# Patient Record
Sex: MALE | Race: WHITE | NOT HISPANIC OR LATINO | Employment: OTHER | URBAN - METROPOLITAN AREA
[De-identification: names, ages, dates, MRNs, and addresses within clinical notes are randomized per-mention and may not be internally consistent; named-entity substitution may affect disease eponyms.]

---

## 2024-11-06 ENCOUNTER — OFFICE VISIT (OUTPATIENT)
Dept: INTERNAL MEDICINE | Facility: CLINIC | Age: 59
End: 2024-11-06
Payer: COMMERCIAL

## 2024-11-06 VITALS
OXYGEN SATURATION: 98 % | HEIGHT: 68 IN | SYSTOLIC BLOOD PRESSURE: 128 MMHG | BODY MASS INDEX: 27.28 KG/M2 | DIASTOLIC BLOOD PRESSURE: 76 MMHG | TEMPERATURE: 98 F | HEART RATE: 56 BPM | WEIGHT: 180 LBS

## 2024-11-06 DIAGNOSIS — Z12.5 SCREENING PSA (PROSTATE SPECIFIC ANTIGEN): ICD-10-CM

## 2024-11-06 DIAGNOSIS — N52.9 ERECTILE DYSFUNCTION, UNSPECIFIED ERECTILE DYSFUNCTION TYPE: ICD-10-CM

## 2024-11-06 DIAGNOSIS — Z23 NEED FOR VACCINATION: ICD-10-CM

## 2024-11-06 DIAGNOSIS — Z13.228 SCREENING FOR ENDOCRINE/METABOLIC/IMMUNITY DISORDERS: ICD-10-CM

## 2024-11-06 DIAGNOSIS — N40.1 BENIGN PROSTATIC HYPERPLASIA WITH NOCTURIA: ICD-10-CM

## 2024-11-06 DIAGNOSIS — R35.1 BENIGN PROSTATIC HYPERPLASIA WITH NOCTURIA: ICD-10-CM

## 2024-11-06 DIAGNOSIS — Z11.59 NEED FOR HEPATITIS C SCREENING TEST: ICD-10-CM

## 2024-11-06 DIAGNOSIS — Z13.29 SCREENING FOR ENDOCRINE/METABOLIC/IMMUNITY DISORDERS: ICD-10-CM

## 2024-11-06 DIAGNOSIS — Z13.0 SCREENING FOR ENDOCRINE/METABOLIC/IMMUNITY DISORDERS: ICD-10-CM

## 2024-11-06 DIAGNOSIS — E03.9 ACQUIRED HYPOTHYROIDISM: Primary | ICD-10-CM

## 2024-11-06 PROCEDURE — 91320 SARSCV2 VAC 30MCG TRS-SUC IM: CPT | Performed by: FAMILY MEDICINE

## 2024-11-06 PROCEDURE — 90656 IIV3 VACC NO PRSV 0.5 ML IM: CPT | Performed by: FAMILY MEDICINE

## 2024-11-06 PROCEDURE — 99204 OFFICE O/P NEW MOD 45 MIN: CPT | Performed by: FAMILY MEDICINE

## 2024-11-06 PROCEDURE — 90480 ADMN SARSCOV2 VAC 1/ONLY CMP: CPT | Performed by: FAMILY MEDICINE

## 2024-11-06 PROCEDURE — 90471 IMMUNIZATION ADMIN: CPT | Performed by: FAMILY MEDICINE

## 2024-11-06 RX ORDER — TADALAFIL 10 MG/1
10 TABLET ORAL DAILY PRN
COMMUNITY

## 2024-11-06 RX ORDER — CETIRIZINE HYDROCHLORIDE 10 MG/1
10 TABLET ORAL DAILY PRN
COMMUNITY

## 2024-11-06 RX ORDER — LEVOTHYROXINE SODIUM 50 MCG
1 TABLET ORAL DAILY
COMMUNITY
Start: 2023-01-16

## 2024-11-06 NOTE — PROGRESS NOTES
"Froylan Javier is a 59 y.o. male.     Chief Complaint   Patient presents with    Establish Care       Visit Vitals  /76 (BP Location: Right arm, Patient Position: Sitting, Cuff Size: Adult)   Pulse 56   Temp 98 °F (36.7 °C)   Ht 171.7 cm (67.6\")   Wt 81.6 kg (180 lb)   SpO2 98%   BMI 27.69 kg/m²         Hypothyroidism  Presents for initial visit. The condition has lasted for 1 year. Symptoms include weight loss (since on thyroid, but is eating better). Patient reports no anxiety, cold intolerance, constipation, depressed mood, diaphoresis, diarrhea, dry skin, fatigue, hair loss, heat intolerance, hoarse voice, leg swelling, nail problem, palpitations, tremors, visual change, weight gain, trouble swallowing, globus sensation or neck mass. The symptoms have been stable. Past treatments include levothyroxine. The treatment provided significant relief. The following procedures have not been performed: radioiodine uptake scan, thyroid FNA, thyroid ultrasound, thyroidectomy and radioiodine ablation. There is no history of atrial fibrillation, dementia, diabetes, Graves' ophthalmopathy, heart failure, hyperlipidemia, neuropathy, obesity or osteopenia. Risk factors for thyroid disorder include family history of hyperthyroidism. There is no known history of prior iodine 131 therapy, history of radiation exposure, history of head/neck radiation, family history of thyroid cancer, family history of hypothyroidism, family history of goiter, history of amiodarone use or history of iodine intake.   Benign Prostatic Hypertrophy  This is a chronic problem. The current episode started more than 1 year ago. The problem is unchanged. Irritative symptoms include nocturia and urgency. Irritative symptoms do not include frequency. Obstructive symptoms do not include dribbling, incomplete emptying, an intermittent stream, a slower stream, straining or a weak stream. Pertinent negatives include no chills, dysuria, genital " pain, hematuria, hesitancy, nausea or vomiting. He is sexually active. The symptoms are aggravated by prolonged sitting. Past treatments include nothing. The treatment provided no relief.   Erectile Dysfunction  This is a chronic problem. The current episode started more than 1 year ago. The problem has been gradually worsening since onset. The nature of his difficulty is achieving erection, maintaining erection and penetration. He reports no anxiety. He reports his erection duration to be 1 to 5 minutes. Irritative symptoms include nocturia and urgency. Irritative symptoms do not include frequency. Obstructive symptoms do not include dribbling, incomplete emptying, an intermittent stream, a slower stream, straining or a weak stream. Pertinent negatives include no chills, dysuria, genital pain, hematuria, hesitancy or inability to urinate. The symptoms are aggravated by alcohol use. Past treatments include tadalafil and sildenafil. The treatment provided mild relief. He has been using treatment for 1 to 2 years. He has had no adverse reactions caused by medications. Risk factors include no AM erections and BPH.      Pt here to establish care, Patient reports colonoscopy last year that was normal. Prior colonoscopy had polyps removed. Pt is in a 5-year repeat cycle.  Patient is taking medication for hypothyroidism but he has enough Synthroid currently.   Patient takes Zyrtec for seasonal allergies.      Pt state that he  has not noticed symptoms of low thyroid.   Patient had side effects with Viagra.  He feels better with Cialis.  Neither 1 is very effective.    Patient was previously treated at [facility].  The following portions of the patient's history were reviewed and updated as appropriate: allergies, current medications, past family history, past medical history, past social history, past surgical history, and problem list.    Past Medical History:   Diagnosis Date    Allergic     Benign prostatic  hyperplasia     this is a concern    Hx of colonoscopy with polypectomy       Past Surgical History:   Procedure Laterality Date    KNEE SURGERY      VASECTOMY  2006    WISDOM TOOTH EXTRACTION        Family History   Problem Relation Age of Onset    Cancer Mother         Pancreatic @ 88    Dementia Father       Social History     Socioeconomic History    Marital status:    Tobacco Use    Smoking status: Never    Smokeless tobacco: Never   Vaping Use    Vaping status: Never Used   Substance and Sexual Activity    Alcohol use: Yes     Alcohol/week: 8.0 standard drinks of alcohol     Types: 8 Cans of beer per week    Drug use: Never    Sexual activity: Yes     Partners: Female     Comment:       No Known Allergies    Review of Systems   Constitutional:  Positive for weight loss (since on thyroid, but is eating better). Negative for chills, diaphoresis, fatigue and weight gain.   HENT:  Negative for hoarse voice and trouble swallowing.    Cardiovascular:  Negative for palpitations.   Gastrointestinal:  Negative for constipation, diarrhea, nausea and vomiting.   Endocrine: Negative for cold intolerance and heat intolerance.   Genitourinary:  Positive for nocturia and urgency. Negative for dysuria, frequency, hematuria, hesitancy and incomplete emptying.   Neurological:  Negative for tremors.   Psychiatric/Behavioral:  The patient is not nervous/anxious.        Objective   Physical Exam  Vitals and nursing note reviewed.   Constitutional:       Appearance: He is well-developed.   HENT:      Head: Normocephalic.      Right Ear: External ear normal.      Left Ear: External ear normal.      Nose: Nose normal.   Eyes:      General: Lids are normal.      Conjunctiva/sclera: Conjunctivae normal.      Pupils: Pupils are equal, round, and reactive to light.   Neck:      Thyroid: No thyroid mass or thyromegaly.      Vascular: No carotid bruit.      Trachea: Trachea normal.   Cardiovascular:      Rate and Rhythm:  Normal rate and regular rhythm.      Heart sounds: No murmur heard.  Pulmonary:      Effort: Pulmonary effort is normal. No respiratory distress.      Breath sounds: Normal breath sounds. No decreased breath sounds, wheezing, rhonchi or rales.   Chest:      Chest wall: No tenderness.   Abdominal:      General: Bowel sounds are normal.      Palpations: Abdomen is soft.      Tenderness: There is no abdominal tenderness.   Musculoskeletal:         General: Normal range of motion.      Cervical back: Normal range of motion and neck supple.   Skin:     General: Skin is warm and dry.   Neurological:      Mental Status: He is alert and oriented to person, place, and time.   Psychiatric:         Behavior: Behavior normal.         Assessment & Plan   Problems Addressed this Visit          Endocrine and Metabolic    Acquired hypothyroidism - Primary    Relevant Medications    Synthroid 50 MCG tablet    Other Relevant Orders    TSH    T4, Free       Genitourinary and Reproductive     Benign prostatic hyperplasia with nocturia     Other Visit Diagnoses       Need for vaccination        Relevant Orders    COVID-19 (Pfizer) 12yrs+ (COMIRNATY) (Completed)    Fluzone >6mos (1535-7332) (Completed)    Erectile dysfunction, unspecified erectile dysfunction type        Screening for endocrine/metabolic/immunity disorders        Relevant Orders    Comprehensive Metabolic Panel    TSH    Lipid Panel    CBC & Differential    PSA Screen    Screening PSA (prostate specific antigen)        Relevant Orders    PSA Screen    Need for hepatitis C screening test        Relevant Orders    HCV Antibody Rfx To Qnt PCR          Diagnoses         Codes Comments    Acquired hypothyroidism    -  Primary ICD-10-CM: E03.9  ICD-9-CM: 244.9     Need for vaccination     ICD-10-CM: Z23  ICD-9-CM: V05.9     Benign prostatic hyperplasia with nocturia     ICD-10-CM: N40.1, R35.1  ICD-9-CM: 600.01, 788.43     Erectile dysfunction, unspecified erectile dysfunction  type     ICD-10-CM: N52.9  ICD-9-CM: 607.84     Screening for endocrine/metabolic/immunity disorders     ICD-10-CM: Z13.29, Z13.228, Z13.0  ICD-9-CM: V77.99     Screening PSA (prostate specific antigen)     ICD-10-CM: Z12.5  ICD-9-CM: V76.44     Need for hepatitis C screening test     ICD-10-CM: Z11.59  ICD-9-CM: V73.89                        Current Outpatient Medications:     cetirizine (zyrTEC) 10 MG tablet, Take 1 tablet by mouth Daily As Needed for Allergies (Seasonal allergies)., Disp: , Rfl:     Synthroid 50 MCG tablet, Take 1 tablet by mouth Daily., Disp: , Rfl:     tadalafil (Cialis) 10 MG tablet, Take 1 tablet by mouth Daily As Needed for Erectile Dysfunction., Disp: , Rfl:     Return in about 3 months (around 2/6/2025), or if symptoms worsen or fail to improve, for Recheck, Annual physical.

## 2024-11-08 ENCOUNTER — PATIENT ROUNDING (BHMG ONLY) (OUTPATIENT)
Dept: INTERNAL MEDICINE | Facility: CLINIC | Age: 59
End: 2024-11-08
Payer: COMMERCIAL

## 2024-11-08 NOTE — PROGRESS NOTES
My name is Michell Kasper and I am a patient experience representative for [facility] on [facility].    I would like to officially welcome you to our practice.  If you do not mind I would like to ask you a few questions about your recent visit with us.  If you do not have the time to reply that is perfectly fine.      First, could you tell me what went well with your recent visit?     Secondly, we are always looking for ways to make our patients' experiences even better. Do you have any recommendations on ways we may improve?     Third, safety is a top priority therefore do you have any concerns with that while in our office?    Finally, overall were you satisfied with your first visit to our practice?     Thank you for taking the time to answer a few questions today.  In the coming days you will receive a survey.  We encourage you to complete the survey to let us know how we did!        Michell

## 2024-11-13 ENCOUNTER — PATIENT MESSAGE (OUTPATIENT)
Dept: INTERNAL MEDICINE | Facility: CLINIC | Age: 59
End: 2024-11-13
Payer: COMMERCIAL

## 2024-11-13 DIAGNOSIS — N52.9 ERECTILE DYSFUNCTION, UNSPECIFIED ERECTILE DYSFUNCTION TYPE: Primary | ICD-10-CM

## 2024-11-14 ENCOUNTER — LAB (OUTPATIENT)
Dept: INTERNAL MEDICINE | Facility: CLINIC | Age: 59
End: 2024-11-14
Payer: COMMERCIAL

## 2024-11-14 DIAGNOSIS — Z13.0 SCREENING FOR ENDOCRINE/METABOLIC/IMMUNITY DISORDERS: ICD-10-CM

## 2024-11-14 DIAGNOSIS — Z13.29 SCREENING FOR ENDOCRINE/METABOLIC/IMMUNITY DISORDERS: ICD-10-CM

## 2024-11-14 DIAGNOSIS — Z11.59 NEED FOR HEPATITIS C SCREENING TEST: ICD-10-CM

## 2024-11-14 DIAGNOSIS — E03.9 ACQUIRED HYPOTHYROIDISM: ICD-10-CM

## 2024-11-14 DIAGNOSIS — Z13.228 SCREENING FOR ENDOCRINE/METABOLIC/IMMUNITY DISORDERS: ICD-10-CM

## 2024-11-14 DIAGNOSIS — Z12.5 SCREENING PSA (PROSTATE SPECIFIC ANTIGEN): ICD-10-CM

## 2024-11-14 DIAGNOSIS — N52.9 ERECTILE DYSFUNCTION, UNSPECIFIED ERECTILE DYSFUNCTION TYPE: ICD-10-CM

## 2024-11-14 LAB
BASOPHILS # BLD AUTO: 0.09 10*3/MM3 (ref 0–0.2)
BASOPHILS NFR BLD AUTO: 1.1 % (ref 0–1.5)
DEPRECATED RDW RBC AUTO: 37.1 FL (ref 37–54)
EOSINOPHIL # BLD AUTO: 0.3 10*3/MM3 (ref 0–0.4)
EOSINOPHIL NFR BLD AUTO: 3.7 % (ref 0.3–6.2)
ERYTHROCYTE [DISTWIDTH] IN BLOOD BY AUTOMATED COUNT: 12 % (ref 12.3–15.4)
HCT VFR BLD AUTO: 47.8 % (ref 37.5–51)
HGB BLD-MCNC: 16.2 G/DL (ref 13–17.7)
IMM GRANULOCYTES # BLD AUTO: 0.04 10*3/MM3 (ref 0–0.05)
IMM GRANULOCYTES NFR BLD AUTO: 0.5 % (ref 0–0.5)
LYMPHOCYTES # BLD AUTO: 3.55 10*3/MM3 (ref 0.7–3.1)
LYMPHOCYTES NFR BLD AUTO: 44.3 % (ref 19.6–45.3)
MCH RBC QN AUTO: 29 PG (ref 26.6–33)
MCHC RBC AUTO-ENTMCNC: 33.9 G/DL (ref 31.5–35.7)
MCV RBC AUTO: 85.5 FL (ref 79–97)
MONOCYTES # BLD AUTO: 0.55 10*3/MM3 (ref 0.1–0.9)
MONOCYTES NFR BLD AUTO: 6.9 % (ref 5–12)
NEUTROPHILS NFR BLD AUTO: 3.48 10*3/MM3 (ref 1.7–7)
NEUTROPHILS NFR BLD AUTO: 43.5 % (ref 42.7–76)
NRBC BLD AUTO-RTO: 0 /100 WBC (ref 0–0.2)
PLATELET # BLD AUTO: 283 10*3/MM3 (ref 140–450)
PMV BLD AUTO: 10 FL (ref 6–12)
RBC # BLD AUTO: 5.59 10*6/MM3 (ref 4.14–5.8)
WBC NRBC COR # BLD AUTO: 8.01 10*3/MM3 (ref 3.4–10.8)

## 2024-11-14 PROCEDURE — 36415 COLL VENOUS BLD VENIPUNCTURE: CPT | Performed by: FAMILY MEDICINE

## 2024-11-14 PROCEDURE — 85025 COMPLETE CBC W/AUTO DIFF WBC: CPT | Performed by: FAMILY MEDICINE

## 2024-11-14 PROCEDURE — 86803 HEPATITIS C AB TEST: CPT | Performed by: FAMILY MEDICINE

## 2024-11-14 PROCEDURE — 84443 ASSAY THYROID STIM HORMONE: CPT | Performed by: FAMILY MEDICINE

## 2024-11-14 PROCEDURE — 84403 ASSAY OF TOTAL TESTOSTERONE: CPT | Performed by: FAMILY MEDICINE

## 2024-11-14 PROCEDURE — 84439 ASSAY OF FREE THYROXINE: CPT | Performed by: FAMILY MEDICINE

## 2024-11-14 PROCEDURE — G0103 PSA SCREENING: HCPCS | Performed by: FAMILY MEDICINE

## 2024-11-14 PROCEDURE — 80061 LIPID PANEL: CPT | Performed by: FAMILY MEDICINE

## 2024-11-14 PROCEDURE — 80053 COMPREHEN METABOLIC PANEL: CPT | Performed by: FAMILY MEDICINE

## 2024-11-15 LAB
ALBUMIN SERPL-MCNC: 4.3 G/DL (ref 3.5–5.2)
ALBUMIN/GLOB SERPL: 1.5 G/DL
ALP SERPL-CCNC: 81 U/L (ref 39–117)
ALT SERPL W P-5'-P-CCNC: 20 U/L (ref 1–41)
ANION GAP SERPL CALCULATED.3IONS-SCNC: 8.7 MMOL/L (ref 5–15)
AST SERPL-CCNC: 20 U/L (ref 1–40)
BILIRUB SERPL-MCNC: 0.7 MG/DL (ref 0–1.2)
BUN SERPL-MCNC: 15 MG/DL (ref 6–20)
BUN/CREAT SERPL: 15.2 (ref 7–25)
CALCIUM SPEC-SCNC: 9.7 MG/DL (ref 8.6–10.5)
CHLORIDE SERPL-SCNC: 105 MMOL/L (ref 98–107)
CHOLEST SERPL-MCNC: 182 MG/DL (ref 0–200)
CO2 SERPL-SCNC: 28.3 MMOL/L (ref 22–29)
CREAT SERPL-MCNC: 0.99 MG/DL (ref 0.76–1.27)
EGFRCR SERPLBLD CKD-EPI 2021: 88.9 ML/MIN/1.73
GLOBULIN UR ELPH-MCNC: 2.8 GM/DL
GLUCOSE SERPL-MCNC: 78 MG/DL (ref 65–99)
HDLC SERPL-MCNC: 43 MG/DL (ref 40–60)
LDLC SERPL CALC-MCNC: 124 MG/DL (ref 0–100)
LDLC/HDLC SERPL: 2.87 {RATIO}
POTASSIUM SERPL-SCNC: 4.7 MMOL/L (ref 3.5–5.2)
PROT SERPL-MCNC: 7.1 G/DL (ref 6–8.5)
PSA SERPL-MCNC: 0.52 NG/ML (ref 0–4)
SODIUM SERPL-SCNC: 142 MMOL/L (ref 136–145)
T4 FREE SERPL-MCNC: 1.46 NG/DL (ref 0.92–1.68)
TESTOST SERPL-MCNC: 646 NG/DL (ref 193–740)
TRIGL SERPL-MCNC: 78 MG/DL (ref 0–150)
TSH SERPL DL<=0.05 MIU/L-ACNC: 3.04 UIU/ML (ref 0.27–4.2)
VLDLC SERPL-MCNC: 15 MG/DL (ref 5–40)

## 2024-11-16 LAB
HCV AB SERPL QL IA: NORMAL
HCV IGG SERPL QL IA: NON REACTIVE

## 2025-01-08 RX ORDER — LEVOTHYROXINE SODIUM 50 MCG
50 TABLET ORAL DAILY
Qty: 90 TABLET | Refills: 1 | Status: SHIPPED | OUTPATIENT
Start: 2025-01-08

## 2025-01-13 ENCOUNTER — TELEPHONE (OUTPATIENT)
Dept: INTERNAL MEDICINE | Facility: CLINIC | Age: 60
End: 2025-01-13
Payer: COMMERCIAL

## 2025-01-13 NOTE — TELEPHONE ENCOUNTER
Synthroid 50 mcg ALYSIA written.  [facility] uses [facility] anyway and by taking off the ALYSIA the patient can get medication for 10.00.  I have let the pharmacy know this will be okay to drop the ALYSIA since they are dispensing the synthroid brand.  [facility] dispenses the brand for their generic.  Dr. Fernández is okay with the change.

## 2025-02-12 ENCOUNTER — OFFICE VISIT (OUTPATIENT)
Dept: INTERNAL MEDICINE | Facility: CLINIC | Age: 60
End: 2025-02-12
Payer: COMMERCIAL

## 2025-02-12 VITALS
TEMPERATURE: 97.7 F | WEIGHT: 190 LBS | HEART RATE: 66 BPM | DIASTOLIC BLOOD PRESSURE: 76 MMHG | SYSTOLIC BLOOD PRESSURE: 134 MMHG | OXYGEN SATURATION: 98 % | BODY MASS INDEX: 28.79 KG/M2 | HEIGHT: 68 IN

## 2025-02-12 DIAGNOSIS — R35.0 BENIGN PROSTATIC HYPERPLASIA WITH URINARY FREQUENCY: ICD-10-CM

## 2025-02-12 DIAGNOSIS — Z00.00 ANNUAL PHYSICAL EXAM: Primary | ICD-10-CM

## 2025-02-12 DIAGNOSIS — L98.9 SKIN LESION OF HAND: ICD-10-CM

## 2025-02-12 DIAGNOSIS — E78.00 ELEVATED CHOLESTEROL: ICD-10-CM

## 2025-02-12 DIAGNOSIS — N40.1 BENIGN PROSTATIC HYPERPLASIA WITH URINARY FREQUENCY: ICD-10-CM

## 2025-02-12 LAB
BILIRUB BLD-MCNC: NEGATIVE MG/DL
CLARITY, POC: CLEAR
COLOR UR: YELLOW
EXPIRATION DATE: NORMAL
GLUCOSE UR STRIP-MCNC: NEGATIVE MG/DL
KETONES UR QL: NEGATIVE
LEUKOCYTE EST, POC: NEGATIVE
Lab: NORMAL
NITRITE UR-MCNC: NEGATIVE MG/ML
PH UR: 7.5 [PH] (ref 5–8)
PROT UR STRIP-MCNC: NEGATIVE MG/DL
RBC # UR STRIP: NEGATIVE /UL
SP GR UR: 1.01 (ref 1–1.03)
UROBILINOGEN UR QL: NORMAL

## 2025-02-12 PROCEDURE — 81003 URINALYSIS AUTO W/O SCOPE: CPT | Performed by: FAMILY MEDICINE

## 2025-02-12 PROCEDURE — 99396 PREV VISIT EST AGE 40-64: CPT | Performed by: FAMILY MEDICINE

## 2025-02-12 RX ORDER — TAMSULOSIN HYDROCHLORIDE 0.4 MG/1
1 CAPSULE ORAL DAILY
Qty: 90 CAPSULE | Refills: 1 | Status: SHIPPED | OUTPATIENT
Start: 2025-02-12

## 2025-02-12 NOTE — PROGRESS NOTES
"Subjective   Theo Javier is a 59 y.o. male.         Chief Complaint   Patient presents with    Annual Exam       Visit Vitals  /76 (BP Location: Right arm, Patient Position: Sitting, Cuff Size: Adult)   Pulse 66   Temp 97.7 °F (36.5 °C)   Ht 171.7 cm (67.6\")   Wt 86.2 kg (190 lb)   SpO2 98%   BMI 29.23 kg/m²         Benign Prostatic Hypertrophy  This is a chronic problem. The current episode started more than 1 year ago. The problem has been gradually worsening since onset. Irritative symptoms include frequency, nocturia and urgency. Obstructive symptoms include incomplete emptying, a slower stream and a weak stream. Obstructive symptoms do not include dribbling, an intermittent stream or straining. Pertinent negatives include no chills, dysuria, hematuria, hesitancy, nausea or vomiting. The symptoms are aggravated by caffeine. Treatments tried: Cialis. The treatment provided mild relief.      Pt here for annual exam. Pt thinks that he had colonoscopy last yr at [facility]    Pt having BPH problems.      Pt has lesion on left hand and does a lot of outside activity.   The following portions of the patient's history were reviewed and updated as appropriate: allergies, current medications, past family history, past medical history, past social history, past surgical history, and problem list.    Past Medical History:   Diagnosis Date    Allergic     Benign prostatic hyperplasia     this is a concern    Hx of colonoscopy with polypectomy       Past Surgical History:   Procedure Laterality Date    COLONOSCOPY  07/23/2024    Dr Murray    KNEE SURGERY      VASECTOMY  2006    WISDOM TOOTH EXTRACTION        Family History   Problem Relation Age of Onset    Cancer Mother         Pancreatic @ 88    Dementia Father       Social History     Socioeconomic History    Marital status:    Tobacco Use    Smoking status: Never    Smokeless tobacco: Never   Vaping Use    Vaping status: Never Used   Substance and " Sexual Activity    Alcohol use: Yes     Alcohol/week: 8.0 standard drinks of alcohol     Types: 8 Cans of beer per week    Drug use: Never    Sexual activity: Yes     Partners: Female     Comment:       No Known Allergies    Review of Systems   Constitutional:  Negative for activity change, appetite change, chills, diaphoresis, fatigue, fever and unexpected weight change.   HENT:  Positive for tinnitus (intermittent). Negative for congestion, dental problem, drooling, ear discharge, ear pain, facial swelling, hearing loss, mouth sores, nosebleeds, postnasal drip, rhinorrhea, sinus pressure, sinus pain, sneezing, sore throat, trouble swallowing and voice change.    Eyes:  Negative for photophobia, pain, discharge, redness, itching and visual disturbance.   Respiratory:  Negative for apnea, cough, choking, chest tightness, shortness of breath, wheezing and stridor.    Cardiovascular:  Negative for chest pain, palpitations and leg swelling.   Gastrointestinal:  Negative for abdominal distention, abdominal pain, anal bleeding, blood in stool, constipation, diarrhea, nausea, rectal pain and vomiting.   Endocrine: Negative for cold intolerance, heat intolerance, polydipsia, polyphagia and polyuria.   Genitourinary:  Positive for decreased urine volume, difficulty urinating, frequency, incomplete emptying, nocturia and urgency. Negative for dysuria, enuresis, flank pain, genital sores, hematuria, hesitancy, penile discharge, penile pain, penile swelling, scrotal swelling and testicular pain.        BPH   Musculoskeletal:  Positive for arthralgias. Negative for back pain (knee pain), gait problem, joint swelling, myalgias, neck pain and neck stiffness.   Skin:  Positive for rash. Negative for color change, pallor and wound.   Allergic/Immunologic: Negative for environmental allergies, food allergies and immunocompromised state.   Neurological:  Negative for dizziness, tremors, seizures, syncope, facial asymmetry,  speech difficulty, weakness, light-headedness, numbness and headaches.   Hematological:  Negative for adenopathy. Does not bruise/bleed easily.   Psychiatric/Behavioral:  Negative for agitation, behavioral problems, confusion, decreased concentration, dysphoric mood, hallucinations, self-injury, sleep disturbance and suicidal ideas. The patient is not nervous/anxious and is not hyperactive.      PHQ-2 Depression Screening  Little interest or pleasure in doing things? Not at all   Feeling down, depressed, or hopeless? Not at all   PHQ-2 Total Score 0     Anxiety OMA-7    Feeling nervous, anxious or on edge: Not at all  Not being able to stop or control worrying: Not at all  Worrying too much about different things: Not at all  Trouble Relaxing: Not at all  Being so restless that it is hard to sit still: Not at all  Becoming easily annoyed or irritable: Not at all  Feeling afraid as if something awful might happen: Not at all  OMA 7 Total Score: 0  If you checked any problems, how difficult have these problems made it for you to do your work, take care of things at home, or get along with other people: Not difficult at all         Objective   Physical Exam  Vitals and nursing note reviewed.   Constitutional:       General: He is not in acute distress.     Appearance: He is well-developed. He is not diaphoretic.   HENT:      Head: Normocephalic and atraumatic.      Right Ear: Tympanic membrane, ear canal and external ear normal.      Left Ear: Tympanic membrane, ear canal and external ear normal.      Nose: Nose normal.      Mouth/Throat:      Lips: Pink.      Mouth: Mucous membranes are moist. Mucous membranes are not pale, not dry and not cyanotic. No injury.      Dentition: Normal dentition.      Tongue: No lesions.      Palate: No mass.      Pharynx: Uvula midline. No pharyngeal swelling, oropharyngeal exudate, posterior oropharyngeal erythema or uvula swelling.   Eyes:      General: Lids are normal. No scleral  icterus.        Right eye: No foreign body, discharge or hordeolum.         Left eye: No foreign body, discharge or hordeolum.      Extraocular Movements:      Right eye: Normal extraocular motion and no nystagmus.      Left eye: Normal extraocular motion and no nystagmus.      Conjunctiva/sclera: Conjunctivae normal.      Right eye: Right conjunctiva is not injected. No chemosis, exudate or hemorrhage.     Left eye: Left conjunctiva is not injected. No chemosis, exudate or hemorrhage.     Pupils: Pupils are equal, round, and reactive to light.   Neck:      Thyroid: No thyroid mass or thyromegaly.      Vascular: No carotid bruit.      Trachea: Trachea normal. No tracheal deviation.   Cardiovascular:      Rate and Rhythm: Normal rate and regular rhythm.      Pulses:           Dorsalis pedis pulses are 2+ on the right side and 2+ on the left side.        Posterior tibial pulses are 2+ on the right side and 2+ on the left side.      Heart sounds: Normal heart sounds. No murmur heard.     No friction rub. No gallop.   Pulmonary:      Effort: Pulmonary effort is normal. No respiratory distress.      Breath sounds: Normal breath sounds. No decreased breath sounds, wheezing, rhonchi or rales.   Chest:      Chest wall: No deformity or tenderness. There is no dullness to percussion.   Breasts:     Breasts are symmetrical.      Right: Normal. No swelling, bleeding, inverted nipple, mass, nipple discharge, skin change or tenderness.      Left: Normal. No swelling, bleeding, inverted nipple, mass, nipple discharge, skin change or tenderness.   Abdominal:      General: Bowel sounds are normal. There is no distension.      Palpations: Abdomen is soft. There is no hepatomegaly, splenomegaly, mass or pulsatile mass.      Tenderness: There is no abdominal tenderness. There is no guarding or rebound.      Hernia: No hernia is present.   Musculoskeletal:         General: No tenderness or deformity. Normal range of motion.       Cervical back: Normal range of motion and neck supple.   Lymphadenopathy:      Head:      Right side of head: No submandibular adenopathy.      Left side of head: No submandibular adenopathy.      Cervical: No cervical adenopathy.      Right cervical: No superficial, deep or posterior cervical adenopathy.     Left cervical: No superficial, deep or posterior cervical adenopathy.      Upper Body:      Right upper body: No supraclavicular, axillary or pectoral adenopathy.      Left upper body: No supraclavicular, axillary or pectoral adenopathy.   Skin:     General: Skin is warm and dry.      Findings: No rash.      Nails: There is no clubbing.             Comments: 2 cm  macular slightly erythematous lesion in the web of left thumb dorsal side.  Slightly irregular surface.   Neurological:      Mental Status: He is alert and oriented to person, place, and time.      Cranial Nerves: No cranial nerve deficit.      Sensory: No sensory deficit.      Coordination: Coordination normal.      Deep Tendon Reflexes: Reflexes are normal and symmetric.      Reflex Scores:       Bicep reflexes are 2+ on the right side and 2+ on the left side.       Brachioradialis reflexes are 2+ on the right side and 2+ on the left side.       Patellar reflexes are 2+ on the right side and 2+ on the left side.  Psychiatric:         Attention and Perception: Attention normal.         Mood and Affect: Mood and affect normal.         Speech: Speech normal.         Behavior: Behavior normal.         Thought Content: Thought content normal.         Cognition and Memory: Cognition and memory normal.         Judgment: Judgment normal.         Assessment & Plan   Problems Addressed this Visit    None  Visit Diagnoses       Annual physical exam    -  Primary    Relevant Orders    POCT urinalysis dipstick, automated (Completed)    Benign prostatic hyperplasia with urinary frequency        Relevant Medications    tamsulosin (FLOMAX) 0.4 MG capsule 24 hr  capsule    Other Relevant Orders    Ambulatory Referral to Urology    Skin lesion of hand        Relevant Orders    Ambulatory Referral to Dermatology (Completed)    Elevated cholesterol        Relevant Orders    Comprehensive Metabolic Panel    Lipid Panel          Diagnoses         Codes Comments    Annual physical exam    -  Primary ICD-10-CM: Z00.00  ICD-9-CM: V70.0     Benign prostatic hyperplasia with urinary frequency     ICD-10-CM: N40.1, R35.0  ICD-9-CM: 600.01, 788.41     Skin lesion of hand     ICD-10-CM: L98.9  ICD-9-CM: 709.9     Elevated cholesterol     ICD-10-CM: E78.00  ICD-9-CM: 272.0           Add Flomax, for BPH,   Derm referral. Urology referral             Current Outpatient Medications:     cetirizine (zyrTEC) 10 MG tablet, Take 1 tablet by mouth Daily As Needed for Allergies (Seasonal allergies)., Disp: , Rfl:     Synthroid 50 MCG tablet, Take 1 tablet by mouth Daily., Disp: 90 tablet, Rfl: 1    tadalafil (Cialis) 10 MG tablet, Take 1 tablet by mouth Daily As Needed for Erectile Dysfunction., Disp: , Rfl:     tamsulosin (FLOMAX) 0.4 MG capsule 24 hr capsule, Take 1 capsule by mouth Daily., Disp: 90 capsule, Rfl: 1    Return in about 6 months (around 8/12/2025), or if symptoms worsen or fail to improve, for Recheck BPH LIPID.    Please follow a low animal fat diet that is also low in sugar, low in junk food, low in sweet drinks and low in alcohol.  Please increase the amount of fiber in your diet as well as increasing your daily exercise, such as walking.  Recent Results (from the past week)   POCT urinalysis dipstick, automated    Collection Time: 02/12/25 10:46 AM    Specimen: Urine   Result Value Ref Range    Color Yellow Yellow, Straw, Dark Yellow, Lily    Clarity, UA Clear Clear    Specific Gravity  1.015 1.005 - 1.030    pH, Urine 7.5 5.0 - 8.0    Leukocytes Negative Negative    Nitrite, UA Negative Negative    Protein, POC Negative Negative mg/dL    Glucose, UA Negative Negative mg/dL     Ketones, UA Negative Negative    Urobilinogen, UA Normal Normal, 0.2 E.U./dL    Bilirubin Negative Negative    Blood, UA Negative Negative    Lot Number [id number]     Expiration Date 4/10/2026

## 2025-04-02 ENCOUNTER — OFFICE VISIT (OUTPATIENT)
Dept: UROLOGY | Facility: CLINIC | Age: 60
End: 2025-04-02
Payer: COMMERCIAL

## 2025-04-02 VITALS — OXYGEN SATURATION: 97 % | DIASTOLIC BLOOD PRESSURE: 88 MMHG | SYSTOLIC BLOOD PRESSURE: 150 MMHG | HEART RATE: 73 BPM

## 2025-04-02 DIAGNOSIS — N40.1 BENIGN PROSTATIC HYPERPLASIA WITH URINARY FREQUENCY: Primary | ICD-10-CM

## 2025-04-02 DIAGNOSIS — R35.0 BENIGN PROSTATIC HYPERPLASIA WITH URINARY FREQUENCY: Primary | ICD-10-CM

## 2025-04-02 NOTE — PROGRESS NOTES
LUTS Male Office Visit      Patient Name: Theo Javier  : 1965   MRN: [id number]     Chief Complaint:  Lower Urinary Tract Symptoms.   Chief Complaint   Patient presents with    Benign Prostatic Hypertrophy    Urinary Frequency        Referring Provider: Zenia Fernández MD    History of Present Illness: Mr. Javier is a 59 y.o. male with history of lower urinary tract symptoms.  He has been diagnosed with presumed BPH. His PSA is normal. He has been on Flomax for 6 wks and has noticed a mild improvement in symptoms. He uses 10 mg Cialis PRN for mild to moderate ED. his main symptoms include weak stream and urgency/frequency.  He wakes up once a night to urinate.  Quality of life is mixed.  No family history of prostate cancer.  PSA is normal.    Lab Results   Component Value Date    PSA 0.523 2024         Subjective      Review of System: Review of Systems   Genitourinary:  Positive for frequency and urgency.      I have reviewed the ROS documented by my clinical staff, I have updated appropriately and I agree. Daryl Cohen MD    Past Medical History:  Past Medical History:   Diagnosis Date    Allergic     Benign prostatic hyperplasia Five years+/-    this is a concern    Erectile dysfunction 10 years +    Hx of colonoscopy with polypectomy        Past Surgical History:  Past Surgical History:   Procedure Laterality Date    COLONOSCOPY  2024    Dr Murray    KNEE SURGERY      VASECTOMY  2006    WISDOM TOOTH EXTRACTION         Medications:    Current Outpatient Medications:     cetirizine (zyrTEC) 10 MG tablet, Take 1 tablet by mouth Daily As Needed for Allergies (Seasonal allergies)., Disp: , Rfl:     Synthroid 50 MCG tablet, Take 1 tablet by mouth Daily., Disp: 90 tablet, Rfl: 1    tadalafil (Cialis) 10 MG tablet, Take 1 tablet by mouth Daily As Needed for Erectile Dysfunction., Disp: , Rfl:     tamsulosin (FLOMAX) 0.4 MG capsule 24 hr capsule, Take 1 capsule by mouth Daily., Disp:  90 capsule, Rfl: 1    Allergies:  No Known Allergies    Social History:  Social History     Socioeconomic History    Marital status:    Tobacco Use    Smoking status: Never    Smokeless tobacco: Never   Vaping Use    Vaping status: Never Used   Substance and Sexual Activity    Alcohol use: Yes     Alcohol/week: 8.0 standard drinks of alcohol     Types: 8 Cans of beer per week    Drug use: Never    Sexual activity: Yes     Partners: Female     Birth control/protection: Vasectomy     Comment:        Family History:  Family History   Problem Relation Age of Onset    Cancer Mother         Pancreatic @ 88    Dementia Father        IPSS Questionnaire (AUA-7):  Over the past month…    1)  Incomplete Emptying:       How often have you had a sensation of not emptying you had the sensation of not emptying your bladder completely after you finished urinating?  3 - About half the time   2)  Frequency:       How often have you had the urinate again less than two hours after you finished urinating?  3 - About half the time   3)  Intermittency:       How often have you found you stopped and started again several times when you urinated?   0 - Not at all   4) Urgency:      How often have you found it difficult to postpone urination?  3 - About half the time   5) Weak Stream:      How often have you had a weak urinary stream?  2 - Less than half the time   6) Straining:       How often have you had to push or strain to begin urination?  0 - Not at all   7) Nocturia:      How many times did you most typically get up to urinate from the time you went to bed at night until the time you got up in the morning?  1 - 1 time   Total Score:  12   The International Prostate Symptom Score (IPSS) is used to screen, diagnose, track symptoms of benign prostatic hyperplasia (BPH).   0-7 (Mild Symptoms) 8-19 (Moderate) 20-35 (Severe)   Quality of Life (QoL):  If you were to spend the rest of your life with your urinary condition just  the way it is now, how would you feel about that? 3-Mixed   Urine Leakage (Incontinence) 0-No Leakage         Post void residual bladder scan:   0 mL    Objective     Physical Exam:   Vital Signs:   Vitals:    04/02/25 1356   BP: 150/88   Pulse: 73   SpO2: 97%     There is no height or weight on file to calculate BMI.     Physical Exam  Constitutional:       Appearance: Normal appearance.   HENT:      Head: Normocephalic and atraumatic.      Nose: Nose normal.   Eyes:      Extraocular Movements: Extraocular movements intact.      Conjunctiva/sclera: Conjunctivae normal.      Pupils: Pupils are equal, round, and reactive to light.   Musculoskeletal:         General: Normal range of motion.      Cervical back: Normal range of motion and neck supple.   Skin:     General: Skin is warm and dry.      Findings: No lesion or rash.   Neurological:      General: No focal deficit present.      Mental Status: He is alert and oriented to person, place, and time. Mental status is at baseline.   Psychiatric:         Mood and Affect: Mood normal.         Behavior: Behavior normal.         Labs:   Lab Results   Component Value Date    PSA 0.523 11/14/2024       Brief Urine Lab Results  (Last result in the past 365 days)        Color   Clarity   Blood   Leuk Est   Nitrite   Protein   CREAT   Urine HCG        02/12/25 1046 Yellow   Clear   Negative   Negative   Negative   Negative                        Lab Results   Component Value Date    GLUCOSE 78 11/14/2024    CALCIUM 9.7 11/14/2024     11/14/2024    K 4.7 11/14/2024    CO2 28.3 11/14/2024     11/14/2024    BUN 15 11/14/2024    CREATININE 0.99 11/14/2024    BCR 15.2 11/14/2024    ANIONGAP 8.7 11/14/2024       Lab Results   Component Value Date    WBC 8.01 11/14/2024    HGB 16.2 11/14/2024    HCT 47.8 11/14/2024    MCV 85.5 11/14/2024     11/14/2024       Images:   No Images in the past 120 days found..    Measures:   Tobacco:   Theo Javier  reports that he has  never smoked. He has never used smokeless tobacco.    Assessment / Plan      Assessment:  Mr. Javier is a 59 y.o. male who presented today with lower urinary tract symptoms.  Recently started Flomax 6 weeks ago.  Mild improvement in symptoms.  Still struggling with weak stream and urgency.  Discussed empiric medication therapy to treat his bothersome storage related symptoms and I would recommend oxybutynin, or proceed with a full workup including cystoscopy, TRUS, uroflow, or continue to allow the tamsulosin to potentially improve his symptoms over time.  He is interested in a further workup.  Risks of cystoscopy include bleeding, dysuria, infection.  Schedule next available.    Diagnoses and all orders for this visit:    1. Benign prostatic hyperplasia with urinary frequency (Primary)            Follow Up:   Return for Cysto TRUS Uroflow in 2-3 weeks .    I spent approximately 30 minutes providing clinical care for this patient; including review of patient's chart and provider documentation, face to face time spent with patient in examination room (obtaining history, performing physical exam, discussing diagnosis and management options), placing orders, and completing patient documentation.     Daryl Cohen MD  [facility] [place]

## 2025-05-01 ENCOUNTER — PROCEDURE VISIT (OUTPATIENT)
Dept: UROLOGY | Facility: CLINIC | Age: 60
End: 2025-05-01
Payer: COMMERCIAL

## 2025-05-01 DIAGNOSIS — N32.81 OAB (OVERACTIVE BLADDER): ICD-10-CM

## 2025-05-01 DIAGNOSIS — R35.0 BENIGN PROSTATIC HYPERPLASIA WITH URINARY FREQUENCY: Primary | ICD-10-CM

## 2025-05-01 DIAGNOSIS — N40.1 BENIGN PROSTATIC HYPERPLASIA WITH URINARY FREQUENCY: Primary | ICD-10-CM

## 2025-05-01 RX ORDER — OXYBUTYNIN CHLORIDE 10 MG/1
10 TABLET, EXTENDED RELEASE ORAL DAILY
Qty: 90 TABLET | Refills: 1 | Status: SHIPPED | OUTPATIENT
Start: 2025-05-01

## 2025-05-01 NOTE — PROGRESS NOTES
Preprocedure diagnosis  LUTS    Postprocedure diagnosis  BPH with LUTS    Procedure  Flexible Cystourethroscopy   Transrectal Ultrasound Guided Prostate Sizing  Uroflowmetry + PVR    Attending surgeon  Daryl Cohen MD    Anesthesia  2% lidocaine jelly intraurethrally    Complications  None    Indications  59 y.o. male undergoing a flexible cystoscopy for the above mentioned indications.  Informed consent was obtained.      Findings  No obvious urethral stricture, bladder tumor, bladder stones, or urothelial lesions identified.    Moderate lateral lobe hyperplasia, no intravesical median lobe.  Mild trabeculation - chronic findings indicative of likely chronic bladder outlet obstruction     TRUS prostate sizing personally performed, (height (cm) × length (cm) × width (cm) × ?/6) = 43 cc/mL.     Procedure  The patient was placed in supine position and prepped and draped in sterile fashion with lidocaine jelly instilled per the urethra for local anesthesia 5 minutes prior to procedure start.  A brief timeout was performed including available nursing staff and the patient.      The 16 Fr digital flexible cystoscope was lubricated and gently advanced into the urethral meatus.     Penile and bulbar urethra appeared widely patent without visible lesion or stricture.   Prostatic urethra demonstrates moderate lateral lobe coaptation, with no median lobe component.     Scope then advanced into the bladder. Bladder was completely visualized starting with the trigone.   There were bilateral orthotopic ureteral orifices effluxing clear urine.   Posterior, lateral, anterior walls, and dome completely visualized revealing NO obvious mucosal lesions, tumors, or bladder stones.    There is mild trabeculation along the posterior and lateral walls with no bladder divericuli.     Cystoscope was retroflexed and bladder neck and prostate further visualized confirming intravesical lateral lobes protruding into bladder floor  somewhat.    The bladder was left filled, and the cystoscope was then gently withdrawn while visualizing the urethra and the procedure was then terminated.      The patient was then positioned and prepped in a left lateral position with legs flexed at knee.    A digital rectal exam was performed revealing smooth gland without nodules or induration.    Rectal ultrasound probe was slowly introduced via anus without difficulty.    Prostate and seminal vesicles were inspected systematically using axial and sagittal views with the ultrasound.      The dimensions of the prostate were measured, for a calculated volume of 43 mL. PSA Density non concerning.    Lab Results   Component Value Date    PSA 0.523 11/14/2024          Rectal ultrasound probe was removed.      The patient tolerated the procedure well.      He then voided for uroflowmetry measurement and PVR bladder scan was performed afterwards.     Discussion:  The patient was moved from the procedure room to a clinic examination room to review results of BPH workup today.     Uroflow   Avg flow: 9.1  ml/sec (low normal)  Max flow: 19.5 ml/sec (normal)   Time to max flow: 5.3 sec (normal)   Voided volume: 175 mL     PVR bladder scan: 2 mL     I have evaluated the patient's ongoing urinary symptoms and options for management with the patient today.     Prostate volume is average.     The patient was counseled regarding options regarding his continued BPH with lower urinary tract symptoms. He is currently medically managed with Tamsulosin.    Medical Options vs Continued Observation  Continued monitoring may be appropriate but this is a shared decision made with patient based on assumed risk.     Continued alpha blocker therapy, the addition of 5-alpha reductase inhibitors (if not already prescribed), or additional anticholinergic/beta 3-agonist medications discussed as options.    Surgical Options  Decision to proceed with surgical intervention is multi-factorial and  based on patient degree of bother, presumed risk of bladder health decline over time, desire to limit or reduce medication usage (polypharmacy), and avoidance of potential side effects of BPH medication usage (light headedness, hypotension, sexual dysfunction, retrograde ejaculation, dry eye/mouth, memory changes, etc).      Surgical options include transurethral resection of prostate, greenlight photo vaporization of prostate, Urolift (prostatic urethral lift), Aquablation, robotic simple prostatectomy (for extreme BPH).     Downsides to TURP include: likely need for overnight inpatient admission, higher bleeding risk, need for bladder irrigation.     Greenlight PVP benefits include: reduced bleeding risk, possible outpatient procedure but does require catheter placement for 48 hours or more or minimally invasive     Urolift benefits include: typically outpatient, often no catheter required post-op, preserves ejaculation without ED risk, prostate volume must be less than 100 cc, good data out to 5 years+ currently, may need additional more aggressive surgery long term if re-growth/re-obstruction, re-treatment rate typically listed as 15% or more long term.     I discussed that both TURP and Greenlight PVP will result in retrograde ejaculation or possibly anejaculation depending on aggressiveness of tissue removal, small risk of transient vs long term (rare) stress incontinence, small risk of urethral stricture or bladder neck contracture, ureteral injury, tissue regrowth requiring additional procedure long term, bladder perforation requiring repair, infection, hematuria, and anesthetic related complications not withstanding. Greenlight PVP and TURP may result in possible better long term symptom reduction vs Urolift however long term data is still accumulating regarding Urolift.     Aquablation is a new BPH treatment option available at [facility]. This accomplishes the same goals as TURP, Greenlight PVP, or  robotic simple prostatectomy with efficient tissue removal with pressurized waterjet therapy followed by transurethral fulguration of bleeding vessels with a cautery loop.  Benefit of this operation is that according to recent literature has less risk of long-term irreversible side effects such as retrograde ejaculation or urinary incontinence while achieving significant and efficient prostate tissue removal.  I still  patients regarding the classic risks of any bladder outlet surgery similar to greenlight and TURP listed above.       PLAN  - flow is good, will trial Ditropan 10 xl for storage symptoms. Discussed surgery and he prefers to avoid BPH surgery at this time.       Daryl Cohen MD

## 2025-06-17 RX ORDER — LEVOTHYROXINE SODIUM 50 UG/1
50 TABLET ORAL DAILY
Qty: 90 TABLET | Refills: 3 | OUTPATIENT
Start: 2025-06-17

## 2025-07-31 DIAGNOSIS — R35.0 BENIGN PROSTATIC HYPERPLASIA WITH URINARY FREQUENCY: ICD-10-CM

## 2025-07-31 DIAGNOSIS — N32.81 OAB (OVERACTIVE BLADDER): ICD-10-CM

## 2025-07-31 DIAGNOSIS — N40.1 BENIGN PROSTATIC HYPERPLASIA WITH URINARY FREQUENCY: ICD-10-CM

## 2025-08-01 RX ORDER — LEVOTHYROXINE SODIUM 50 MCG
50 TABLET ORAL DAILY
Qty: 90 TABLET | Refills: 1 | Status: SHIPPED | OUTPATIENT
Start: 2025-08-01 | End: 2025-08-04 | Stop reason: ALTCHOICE

## 2025-08-01 RX ORDER — OXYBUTYNIN CHLORIDE 10 MG/1
10 TABLET, EXTENDED RELEASE ORAL DAILY
Qty: 90 TABLET | Refills: 1 | Status: SHIPPED | OUTPATIENT
Start: 2025-08-01

## 2025-08-01 NOTE — TELEPHONE ENCOUNTER
Rx Refill Note  Requested Prescriptions     Pending Prescriptions Disp Refills    oxybutynin XL (Ditropan XL) 10 MG 24 hr tablet 90 tablet 1     Sig: Take 1 tablet by mouth Daily.      Last office visit with prescribing clinician: 4/2/2025   Next office visit with prescribing clinician: 10/1/2025       Keily Fields MA  08/01/25, 07:51 EDT

## 2025-08-04 ENCOUNTER — TELEPHONE (OUTPATIENT)
Dept: INTERNAL MEDICINE | Facility: CLINIC | Age: 60
End: 2025-08-04
Payer: COMMERCIAL

## 2025-08-04 ENCOUNTER — OFFICE VISIT (OUTPATIENT)
Dept: INTERNAL MEDICINE | Facility: CLINIC | Age: 60
End: 2025-08-04
Payer: COMMERCIAL

## 2025-08-04 VITALS
OXYGEN SATURATION: 97 % | HEIGHT: 68 IN | WEIGHT: 184 LBS | BODY MASS INDEX: 27.89 KG/M2 | TEMPERATURE: 97.5 F | SYSTOLIC BLOOD PRESSURE: 136 MMHG | DIASTOLIC BLOOD PRESSURE: 80 MMHG | HEART RATE: 70 BPM

## 2025-08-04 DIAGNOSIS — E03.9 ACQUIRED HYPOTHYROIDISM: Primary | ICD-10-CM

## 2025-08-04 DIAGNOSIS — N40.1 BENIGN PROSTATIC HYPERPLASIA WITH URINARY FREQUENCY: ICD-10-CM

## 2025-08-04 DIAGNOSIS — R35.0 BENIGN PROSTATIC HYPERPLASIA WITH URINARY FREQUENCY: ICD-10-CM

## 2025-08-04 DIAGNOSIS — Z79.899 ENCOUNTER FOR LONG-TERM (CURRENT) USE OF MEDICATIONS: ICD-10-CM

## 2025-08-04 PROCEDURE — 99214 OFFICE O/P EST MOD 30 MIN: CPT | Performed by: FAMILY MEDICINE

## 2025-08-04 RX ORDER — LEVOTHYROXINE SODIUM 50 UG/1
50 TABLET ORAL
Qty: 90 TABLET | Refills: 1 | Status: SHIPPED | OUTPATIENT
Start: 2025-08-04 | End: 2025-08-04 | Stop reason: SDUPTHER

## 2025-08-04 RX ORDER — LEVOTHYROXINE SODIUM 50 UG/1
50 TABLET ORAL
Qty: 28 TABLET | Refills: 0 | COMMUNITY
Start: 2025-08-04

## 2025-08-04 RX ORDER — TAMSULOSIN HYDROCHLORIDE 0.4 MG/1
1 CAPSULE ORAL DAILY
Qty: 90 CAPSULE | Refills: 1 | Status: SHIPPED | OUTPATIENT
Start: 2025-08-04

## 2025-08-07 ENCOUNTER — LAB (OUTPATIENT)
Dept: INTERNAL MEDICINE | Facility: CLINIC | Age: 60
End: 2025-08-07
Payer: COMMERCIAL

## 2025-08-07 DIAGNOSIS — E78.00 ELEVATED CHOLESTEROL: ICD-10-CM

## 2025-08-07 DIAGNOSIS — Z79.899 ENCOUNTER FOR LONG-TERM (CURRENT) USE OF MEDICATIONS: ICD-10-CM

## 2025-08-07 DIAGNOSIS — E03.9 ACQUIRED HYPOTHYROIDISM: ICD-10-CM

## 2025-08-07 LAB
ALBUMIN SERPL-MCNC: 4.4 G/DL (ref 3.5–5.2)
ALBUMIN/GLOB SERPL: 1.7 G/DL
ALP SERPL-CCNC: 94 U/L (ref 39–117)
ALT SERPL W P-5'-P-CCNC: 26 U/L (ref 1–41)
ANION GAP SERPL CALCULATED.3IONS-SCNC: 13 MMOL/L (ref 5–15)
AST SERPL-CCNC: 24 U/L (ref 1–40)
BILIRUB SERPL-MCNC: 0.9 MG/DL (ref 0–1.2)
BUN SERPL-MCNC: 15 MG/DL (ref 6–20)
BUN/CREAT SERPL: 15.3 (ref 7–25)
CALCIUM SPEC-SCNC: 9.4 MG/DL (ref 8.6–10.5)
CHLORIDE SERPL-SCNC: 106 MMOL/L (ref 98–107)
CHOLEST SERPL-MCNC: 169 MG/DL (ref 0–200)
CO2 SERPL-SCNC: 23 MMOL/L (ref 22–29)
CREAT SERPL-MCNC: 0.98 MG/DL (ref 0.76–1.27)
EGFRCR SERPLBLD CKD-EPI 2021: 89.9 ML/MIN/1.73
GLOBULIN UR ELPH-MCNC: 2.6 GM/DL
GLUCOSE SERPL-MCNC: 78 MG/DL (ref 65–99)
HDLC SERPL-MCNC: 43 MG/DL (ref 40–60)
LDLC SERPL CALC-MCNC: 101 MG/DL (ref 0–100)
LDLC/HDLC SERPL: 2.29 {RATIO}
POTASSIUM SERPL-SCNC: 4.1 MMOL/L (ref 3.5–5.2)
PROT SERPL-MCNC: 7 G/DL (ref 6–8.5)
SODIUM SERPL-SCNC: 142 MMOL/L (ref 136–145)
TRIGL SERPL-MCNC: 138 MG/DL (ref 0–150)
TSH SERPL DL<=0.05 MIU/L-ACNC: 2.19 UIU/ML (ref 0.27–4.2)
VLDLC SERPL-MCNC: 25 MG/DL (ref 5–40)

## 2025-08-07 PROCEDURE — 80053 COMPREHEN METABOLIC PANEL: CPT | Performed by: FAMILY MEDICINE

## 2025-08-07 PROCEDURE — 80061 LIPID PANEL: CPT | Performed by: FAMILY MEDICINE

## 2025-08-07 PROCEDURE — 84439 ASSAY OF FREE THYROXINE: CPT | Performed by: FAMILY MEDICINE

## 2025-08-07 PROCEDURE — 84443 ASSAY THYROID STIM HORMONE: CPT | Performed by: FAMILY MEDICINE

## 2025-08-07 PROCEDURE — 36415 COLL VENOUS BLD VENIPUNCTURE: CPT | Performed by: FAMILY MEDICINE

## 2025-08-08 ENCOUNTER — RESULTS FOLLOW-UP (OUTPATIENT)
Dept: INTERNAL MEDICINE | Facility: CLINIC | Age: 60
End: 2025-08-08
Payer: COMMERCIAL

## 2025-08-08 LAB — T4 FREE SERPL-MCNC: 1.3 NG/DL (ref 0.92–1.68)
